# Patient Record
Sex: MALE | Race: WHITE
[De-identification: names, ages, dates, MRNs, and addresses within clinical notes are randomized per-mention and may not be internally consistent; named-entity substitution may affect disease eponyms.]

---

## 2019-03-01 ENCOUNTER — HOSPITAL ENCOUNTER (EMERGENCY)
Dept: HOSPITAL 25 - UCEAST | Age: 64
Discharge: ANOTHER HOSPITAL | End: 2019-03-01
Payer: MEDICARE

## 2019-03-01 VITALS — DIASTOLIC BLOOD PRESSURE: 93 MMHG | SYSTOLIC BLOOD PRESSURE: 140 MMHG

## 2019-03-01 DIAGNOSIS — Z87.891: ICD-10-CM

## 2019-03-01 DIAGNOSIS — Z88.5: ICD-10-CM

## 2019-03-01 DIAGNOSIS — Z88.0: ICD-10-CM

## 2019-03-01 DIAGNOSIS — R50.9: Primary | ICD-10-CM

## 2019-03-01 DIAGNOSIS — I10: ICD-10-CM

## 2019-03-01 DIAGNOSIS — R09.02: ICD-10-CM

## 2019-03-01 DIAGNOSIS — R41.0: ICD-10-CM

## 2019-03-01 PROCEDURE — G0463 HOSPITAL OUTPT CLINIC VISIT: HCPCS

## 2019-03-01 PROCEDURE — 99203 OFFICE O/P NEW LOW 30 MIN: CPT

## 2019-03-01 NOTE — UC
Respiratory Complaint HPI





- HPI Summary


HPI Summary: 


63-year-old male comes in noting care with chief complaint of febrile illness 

with cough chest congestion.  Patient reports he's had some upper respiratory 

tract infection symptoms for couple of weeks.  Last couple of days he has been 

more fatigued.  He's been sleeping more.  Today his wife relates that he's been 

confused.  Here in clinic finding temperature 102.5.  No complaint of any chest 

pain or edema or dysuria or abdominal pain.  Shortness breath is worse with 

activity.  Better with rest.





- History of Current Complaint


Chief Complaint: UCRespiratory


Stated Complaint: HIGH BP,COUGH


Time Seen by Provider: 03/01/19 19:01


Pain Intensity: 0





- Allergies/Home Medications


Allergies/Adverse Reactions: 


 Allergies











Allergy/AdvReac Type Severity Reaction Status Date / Time


 


hydrocodone Allergy  Difficulty Verified 03/01/19 19:01





   Breathing  


 


Penicillins Allergy  Difficulty Verified 03/01/19 19:01





   Breathing  











Home Medications: 


 Home Medications





Amlodipine Besylate [Amlodipine 2.5 mg tab] 5 mg PO DAILY 03/01/19 [History 

Confirmed 03/01/19]


Furosemide TAB* [Lasix TAB*] 20 mg PO DAILY 03/01/19 [History Confirmed 03/01/19

]


Losartan TAB* [Cozaar TAB*] 100 mg PO DAILY 03/01/19 [History Confirmed 03/01/19

]


Metoprolol Tartrate 25 mg PO BID 03/01/19 [History Confirmed 03/01/19]


Simvastatin [Zocor] 40 mg PO DAILY 03/01/19 [History Confirmed 03/01/19]


Sulfamethox/Trimethoprim DS* [Bactrim /160 TAB*] 1 tab PO TID 03/01/19 [

History Confirmed 03/01/19]


predniSONE TAB* [Deltasone 20 MG TAB*] 20 mg PO DAILY 03/01/19 [History 

Confirmed 03/01/19]











PMH/Surg Hx/FS Hx/Imm Hx


Previously Healthy: Yes - KIDNEY PROBLEM, PSORIATIC ARTHRITIS


Endocrine History: Dyslipidemia


Cardiovascular History: Hypertension





- Surgical History


Surgery Procedure, Year, and Place: kidney biopsy





- Family History


Known Family History: Positive: Non-Contributory





- Social History


Alcohol Use: None


Substance Use Type: None


Smoking Status (MU): Former Smoker





Review of Systems


All Other Systems Reviewed And Are Negative: Yes


Constitutional: Positive: Fever, Chills, Fatigue


Skin: Positive: Negative


Eyes: Positive: Negative


ENT: Positive: Nasal Discharge, Sinus Congestion


Respiratory: Positive: Shortness Of Breath, Cough


Cardiovascular: Positive: Negative


Gastrointestinal: Positive: Negative


Genitourinary: Positive: Negative


Motor: Positive: Weakness


Neurovascular: Positive: Negative


Musculoskeletal: Positive: Negative


Neurological: Positive: Weakness


Psychological: Positive: Negative


Is Patient Immunocompromised?: No





Physical Exam


Triage Information Reviewed: Yes


Completion Of Physical Exam Limited Due To: Altered Mental Status - MILDLY 

CONFUSED.


Appearance: No Pain Distress, Well-Nourished, Ill-Appearing - MILD/MODERATED


Vital Signs: 


 Initial Vital Signs











Temp  102.5 F   03/01/19 18:58


 


Pulse  98   03/01/19 18:58


 


Resp  18   03/01/19 18:58


 


BP  118/88   03/01/19 18:58


 


Pulse Ox  89   03/01/19 18:58











Vital Signs Reviewed: Yes


Eye Exam: Normal


Eyes: Positive: Conjunctiva Clear


ENT: Positive: Nasal congestion, Nasal drainage


Neck exam: Normal


Neck: Positive: Supple


Respiratory: Positive: No respiratory distress, Crackles, Rhonchi


Cardiovascular: Positive: Tachycardia


Abdomen Description: Positive: Nontender, Soft


Musculoskeletal: Positive: Strength Intact, ROM Intact, No Edema


Neurological: Positive: Muscle Tone Normal, Other: - MILD CONFUSION


Psychological: Positive: Age Appropriate Behavior


Skin Exam: Normal





Diagnostics





- EKG


Cardiac Rate: Tachycardia - AT 1910


Cardiac Rhythm: Other Rhythm: New - 109 BPM, MULTIPLE PVCS AND PACS


Ectopy: PVCs, PACs


ST Segment: Normal





Respiratory Course/Dx





- Course


Course Of Treatment: Patient has a fever is hypoxic or tachycardic and mildly 

confused.  Scattered rhonchi on examination.  Probable sepsis potentially from 

pneumonia.  Patient is being transported to the emergency department by 

ambulance.





- Differential Dx/Diagnosis


Provider Diagnosis: 


 Hypoxia, Fever, Confusion








Discharge





- Sign-Out/Discharge


Documenting (check all that apply): Patient Departure


All imaging exams completed and their final reports reviewed: No Studies





- Discharge Plan


Condition: Fair


Disposition: TRANS HIGHER LVL OF CARE FAC


Referrals: 


Deep Meade MD [Primary Care Provider] - 





- Billing Disposition and Condition


Condition: FAIR


Disposition: Trans Higher Lvl of Care Fac